# Patient Record
Sex: FEMALE | URBAN - METROPOLITAN AREA
[De-identification: names, ages, dates, MRNs, and addresses within clinical notes are randomized per-mention and may not be internally consistent; named-entity substitution may affect disease eponyms.]

---

## 2019-03-18 ENCOUNTER — APPOINTMENT (RX ONLY)
Dept: URBAN - METROPOLITAN AREA CLINIC 73 | Facility: CLINIC | Age: 29
Setting detail: DERMATOLOGY
End: 2019-03-18

## 2019-03-18 VITALS — HEIGHT: 68 IN | WEIGHT: 125 LBS

## 2019-03-18 DIAGNOSIS — L259 CONTACT DERMATITIS AND OTHER ECZEMA, UNSPECIFIED CAUSE: ICD-10-CM

## 2019-03-18 PROBLEM — L23.9 ALLERGIC CONTACT DERMATITIS, UNSPECIFIED CAUSE: Status: ACTIVE | Noted: 2019-03-18

## 2019-03-18 PROBLEM — F41.9 ANXIETY DISORDER, UNSPECIFIED: Status: ACTIVE | Noted: 2019-03-18

## 2019-03-18 PROBLEM — L55.1 SUNBURN OF SECOND DEGREE: Status: ACTIVE | Noted: 2019-03-18

## 2019-03-18 PROBLEM — J30.1 ALLERGIC RHINITIS DUE TO POLLEN: Status: ACTIVE | Noted: 2019-03-18

## 2019-03-18 PROCEDURE — ? TREATMENT REGIMEN

## 2019-03-18 PROCEDURE — 99202 OFFICE O/P NEW SF 15 MIN: CPT

## 2019-03-18 PROCEDURE — ? OTHER

## 2019-03-18 PROCEDURE — ? PRESCRIPTION

## 2019-03-18 PROCEDURE — ? COUNSELING

## 2019-03-18 RX ORDER — TRIAMCINOLONE ACETONIDE 1 MG/G
CREAM TOPICAL
Qty: 1 | Refills: 1 | Status: ERX | COMMUNITY
Start: 2019-03-18

## 2019-03-18 RX ADMIN — TRIAMCINOLONE ACETONIDE: 1 CREAM TOPICAL at 13:16

## 2019-03-18 ASSESSMENT — LOCATION ZONE DERM: LOCATION ZONE: ARM

## 2019-03-18 ASSESSMENT — LOCATION SIMPLE DESCRIPTION DERM: LOCATION SIMPLE: RIGHT FOREARM

## 2019-03-18 ASSESSMENT — SEVERITY ASSESSMENT: SEVERITY: MODERATE

## 2019-03-18 ASSESSMENT — LOCATION DETAILED DESCRIPTION DERM: LOCATION DETAILED: RIGHT DISTAL RADIAL DORSAL FOREARM

## 2019-03-18 ASSESSMENT — PAIN INTENSITY VAS: HOW INTENSE IS YOUR PAIN 0 BEING NO PAIN, 10 BEING THE MOST SEVERE PAIN POSSIBLE?: NO PAIN

## 2019-03-18 NOTE — HPI: RASH
How Severe Is Your Rash?: moderate
Is This A New Presentation, Or A Follow-Up?: Rash
Additional History: Patient states the tattoo has been new since Jan 2019. Patient has never had red dye before in her other tattoos. Patient states her skin felt rough and started to get holes that appeared to look like blackheads. Patient has been using preparation H at the request of her  and reports it has not been helping.

## 2019-03-18 NOTE — PROCEDURE: OTHER
Detail Level: Zone
Note Text (......Xxx Chief Complaint.): This diagnosis correlates with the
Other (Free Text): -d/w pt treatment option\\n-Pt wants entire tattoo on right arm was removed.\\n-price est was given. See attachments.***was emphasized needed to discuss with provider who will be running the laser\\n-Pt had questions about tattoo laser removal, will have patient visit for a cosmetic consultation

## 2019-03-18 NOTE — PROCEDURE: TREATMENT REGIMEN
Detail Level: Zone
Samples Given: Cordran cream x1
Initiate Treatment: TAC .1% apply to affected area on arm twice a day x 2 weeks

## 2019-03-18 NOTE — PROCEDURE: MIPS QUALITY
Quality 110: Preventive Care And Screening: Influenza Immunization: Influenza Immunization Administered during Influenza season
Detail Level: Detailed
Quality 131: Pain Assessment And Follow-Up: Pain assessment using a standardized tool is documented as negative, no follow-up plan required
Quality 128: Preventive Care And Screening: Body Mass Index (Bmi) Screening And Follow-Up Plan: BMI is documented within normal parameters and no follow-up plan is required.
Quality 130: Documentation Of Current Medications In The Medical Record: Current Medications Documented